# Patient Record
Sex: MALE | Race: BLACK OR AFRICAN AMERICAN | NOT HISPANIC OR LATINO | Employment: UNEMPLOYED | URBAN - METROPOLITAN AREA
[De-identification: names, ages, dates, MRNs, and addresses within clinical notes are randomized per-mention and may not be internally consistent; named-entity substitution may affect disease eponyms.]

---

## 2018-09-16 ENCOUNTER — APPOINTMENT (OUTPATIENT)
Dept: RADIOLOGY | Facility: MEDICAL CENTER | Age: 28
End: 2018-09-16
Attending: EMERGENCY MEDICINE

## 2018-09-16 ENCOUNTER — HOSPITAL ENCOUNTER (EMERGENCY)
Facility: MEDICAL CENTER | Age: 28
End: 2018-09-16
Attending: EMERGENCY MEDICINE

## 2018-09-16 VITALS
HEART RATE: 65 BPM | HEIGHT: 69 IN | TEMPERATURE: 98.7 F | OXYGEN SATURATION: 96 % | RESPIRATION RATE: 18 BRPM | WEIGHT: 125.44 LBS | DIASTOLIC BLOOD PRESSURE: 78 MMHG | BODY MASS INDEX: 18.58 KG/M2 | SYSTOLIC BLOOD PRESSURE: 113 MMHG

## 2018-09-16 DIAGNOSIS — S20.212A CHEST WALL CONTUSION, LEFT, INITIAL ENCOUNTER: ICD-10-CM

## 2018-09-16 PROCEDURE — 99284 EMERGENCY DEPT VISIT MOD MDM: CPT

## 2018-09-16 PROCEDURE — 700102 HCHG RX REV CODE 250 W/ 637 OVERRIDE(OP): Performed by: EMERGENCY MEDICINE

## 2018-09-16 PROCEDURE — A9270 NON-COVERED ITEM OR SERVICE: HCPCS | Performed by: EMERGENCY MEDICINE

## 2018-09-16 PROCEDURE — 71101 X-RAY EXAM UNILAT RIBS/CHEST: CPT | Mod: LT

## 2018-09-16 RX ORDER — IBUPROFEN 600 MG/1
600 TABLET ORAL ONCE
Status: COMPLETED | OUTPATIENT
Start: 2018-09-16 | End: 2018-09-16

## 2018-09-16 RX ORDER — ACETAMINOPHEN 325 MG/1
650 TABLET ORAL ONCE
Status: COMPLETED | OUTPATIENT
Start: 2018-09-16 | End: 2018-09-16

## 2018-09-16 RX ADMIN — ACETAMINOPHEN 650 MG: 325 TABLET, FILM COATED ORAL at 20:37

## 2018-09-16 RX ADMIN — IBUPROFEN 600 MG: 600 TABLET ORAL at 20:37

## 2018-09-16 ASSESSMENT — PAIN SCALES - GENERAL: PAINLEVEL_OUTOF10: 10

## 2018-09-17 NOTE — ED PROVIDER NOTES
"ED Provider Note    Scribed for Yvan Elizabeth M.D. by Yvan Elizabeth. 9/16/2018  8:25 PM    CHIEF COMPLAINT  Chief Complaint   Patient presents with   • Fall   • Rib Pain     left       HPI  Cr Bolden is a 27 y.o. male who presents to the Emergency Room for left lower anterior rib pain after a reported fall down a flight of concrete stairs.  He has no other pain complaints.  He did not hit his head.  He did not lose consciousness.  He reports worsening pain with palpation or deep inspiration.  He has no history of easy bleeding or bruising.  He is awake, alert, pleasant, cooperative, with a room air oxygen saturation of 98%.    REVIEW OF SYSTEMS  See [facility] for further details.    PAST MEDICAL HISTORY   No frequent infections, no bleeding dyscrasias.    SOCIAL HISTORY  Social History     Social History Main Topics   • Smoking status: Not on file   • Smokeless tobacco: Not on file   • Alcohol use Not on file   • Drug use: Unknown   • Sexual activity: Not on file       SURGICAL HISTORY  patient denies any surgical history    CURRENT MEDICATIONS  Home Medications    **Home medications have not yet been reviewed for this encounter**         ALLERGIES  Allergies   Allergen Reactions   • Shellfish Allergy Swelling       PHYSICAL EXAM  VITAL SIGNS: /78   Pulse 65   Temp 37.1 °C (98.7 °F)   Resp 18   Ht 1.753 m (5' 9\")   Wt 56.9 kg (125 lb 7.1 oz)   SpO2 96%   BMI 18.52 kg/m²   Pulse ox interpretation: I interpret this pulse ox as normal.  Constitutional: Alert in no apparent distress.  HENT: Normocephalic, Atraumatic, Bilateral external ears normal. Nose normal.   Eyes: Conjunctiva normal, non-icteric.   Heart: Regular rate and rythm, no murmurs.    Lungs: Clear to auscultation bilaterally.  Moderate tenderness to the anterior lower rib border.  No visible or palpable evidence of trauma.  Skin: Warm, Dry, No erythema, No rash.   Neurologic: Alert, Grossly non-focal.   Psychiatric: Affect normal, " Judgment normal, Mood normal, Appears appropriate and not intoxicated.     [id number] (WITH 1-VIEW CXR) LEFT   Final Result      1.  There are no acute displaced left rib fractures.   2.  There is scoliosis.          COURSE & MEDICAL DECISION MAKING  The patient's VS, Nurses notes reviewed. (See chart for details)    8:25 PM Patient seen and examined at bedside. Ordered for unilateral rib x-ray with AP chest to evaluate to exclude rib fractures, pulmonary contusion, pneumothorax. Patient will be treated with Tylenol, Motrin, and an ice pack.  For his symptoms.     This patient's x-ray was normal.  I explained to him at the bedside that he does not have any evidence of rib fracture, though he has likely bruised his ribs, and can expect significant soreness over the next few days.  We discussed supportive care, as well as return precautions.       The patient will return for new or worsening symptoms and is stable at the time of discharge.    The patient is referred to a primary physician for blood pressure management, diabetic screening, and for all other preventative health concerns.      DISPOSITION:  Patient will be discharged home in stable condition.    FOLLOW UP:  Your primary care doctor      As needed      OUTPATIENT MEDICATIONS:  There are no discharge medications for this patient.        FINAL IMPRESSION  1. Chest wall contusion, left, initial encounter

## 2018-09-17 NOTE — ED NOTES
Pt to er with c/o left sided rib pain after fall on stairs approx 10 minutes pta. Denies loc or other injury. In no apparent resp distess. rm air sat=98%

## 2018-09-17 NOTE — DISCHARGE INSTRUCTIONS
Chest Contusion, Adult  A chest contusion is a deep bruise to the chest. Contusions are usually the result of a blunt injury to tissues under the skin. The injury can damage the small blood vessels under the skin, which causes bleeding under the skin. The skin overlying the contusion may turn blue, purple, or yellow. Minor injuries may give you a painless contusion, but more severe contusions may stay painful and swollen for a few weeks.  What are the causes?  A contusion is usually caused by a hard hit (blow), trauma, or direct force to your chest, such as:  · A motor vehicle accident.  · Falls.  · Bicycle injuries.  · Contact sport injuries.  What increases the risk?  You may be at a higher risk for a chest contusion if you play a sport in which falls and contact are common, such as football or soccer.  What are the signs or symptoms?  Symptoms of this condition include:  · Chest swelling.  · Pain and tenderness of the chest.  · Discomfort with certain movements of the upper torso.  · Discoloration of the chest. The area may have redness and then turn blue, purple, or yellow.  · Discomfort when taking deep breaths.  How is this diagnosed?  A chest contusion is diagnosed from a physical exam and your medical history. An X-ray may be needed to determine if there were any associated injuries, such as broken bones (fractures). Sometimes other tests such as CT scans, ultrasounds, or MRIs may be needed if internal injuries are suspected.  A test that shows the amount of oxygen in your blood (pulse oximetry) may be done if you have trouble breathing.  How is this treated?  Often, the best treatment for a chest contusion is resting and applying ice to the injured area. Deep-breathing exercises may be recommended to reduce the risk of pneumonia. Oxygen therapy may be given if you have trouble breathing or have low oxygen levels. Over-the-counter medicines may also be recommended for pain control.  Follow these instructions  at home:  · If directed, apply ice to the injured area.  ¨ Put ice in a plastic bag.  ¨ Place a towel between your skin and the bag.  ¨ Leave the ice on for 20 minutes, 2-3 times per day.  · Take over-the-counter and prescription medicines only as told by your health care provider.  · Do any deep-breathing exercises as told by your health care provider, if this applies.  · Do not lie down flat on your back. Keep your head and chest raised (elevated) when you are resting or sleeping.  · Do not use any products that contain nicotine or tobacco, such as cigarettes and e-cigarettes. If you need help quitting, ask your health care provider.  · Do not lift anything that causes you discomfort or pain.  Contact a health care provider if:  · Your swelling or pain is not relieved with medicines or treatment.  · You have increased bruising or swelling.  · You have pain that is getting worse.  · Your symptoms have not improved after one week.  Get help right away if:  · You have a sudden, significant increase in pain.  · You have difficulty breathing.  · You have dizziness, weakness, or fainting.  · You have blood in your urine or stool.  · You cough up blood or you vomit blood.  Summary  · A chest contusion is a deep bruise to the chest that is usually caused by a hard hit, trauma, or direct force to your chest.  · Treatment for a chest contusion may include resting and applying ice to the injured area.  · Contact a health care provider if you have problems breathing or if your pain does not improve with treatment.  This information is not intended to replace advice given to you by your health care provider. Make sure you discuss any questions you have with your health care provider.  Document Released: 09/12/2002 Document Revised: 09/14/2017 Document Reviewed: 09/14/2017  [employer] Patient Education © 2017 [employer]

## 2018-09-17 NOTE — ED NOTES
Pt given discharge instructions and ice pack per ERP order, pt verbalized understanding all information given. Pt ambulated out of the ER w/ friend